# Patient Record
(demographics unavailable — no encounter records)

---
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
## 2025-01-07 NOTE — DATA REVIEWED
[FreeTextEntry1] :  FINDINGS:  There is no evidence of acute infarction, intracranial hemorrhage or mass lesion.  There is hypoattenuation of the subcortical and periventricular white matter, which is nonspecific finding, but most likely represents sequela of chronic microvascular ischemic disease. There are atherosclerotic calcifications of the cavernous and supraclinoid internal carotid arteries bilaterally, and bilateral intradural vertebral arteries. There is prominence of the cortical sulci related to underlying brain parenchymal volume loss.  There is no evidence of hydrocephalus. There are no extra-axial fluid collections.  The patient is status post bilateral lens replacement. The imaged portions of the paranasal sinuses are aerated. The mastoid air cells are clear. The visualized soft tissues and osseous structures appear unremarkable.   IMPRESSION:  No acute intracranial findings.

## 2025-01-07 NOTE — ASSESSMENT
[FreeTextEntry1] : DC MEDS REVIEWED amLODIPine 5 mg oral tablet: 1 tab(s) orally once a day atorvastatin 10 mg oral tablet: 1 tab(s) orally once a day enalapril 20 mg oral tablet: orally 2 times a day Lasix 20 mg oral tablet: 1 tab(s) orally once a day, As Needed metFORMIN 500 mg oral tablet, extended release: 2 tab(s) orally 2 times a day sotalol 120 mg oral tablet: 1 tab(s) orally 2 times a day Xarelto 20 mg oral tablet: 1 tab(s) orally once a day (in the evening)   Dtr concernred about memory / reflexes -= memory getting owrse

## 2025-01-07 NOTE — PHYSICAL EXAM
[Alert] : alert [No Acute Distress] : in no acute distress [Sclera] : the sclera and conjunctiva were normal [EOMI] : extraocular movements were intact [Normal Outer Ear/Nose] : the ears and nose were normal in appearance [Normal Appearance] : the appearance of the neck was normal [Supple] : the neck was supple [No Respiratory Distress] : no respiratory distress [No Acc Muscle Use] : no accessory muscle use [Respiration, Rhythm And Depth] : normal respiratory rhythm and effort [Auscultation Breath Sounds / Voice Sounds] : lungs were clear to auscultation bilaterally [Heart Rate And Rhythm] : heart rate was normal and rhythm regular [Normal Color / Pigmentation] : normal skin color and pigmentation [Normal Turgor] : normal skin turgor [No Focal Deficits] : no focal deficits [Normal Affect] : the affect was normal [Normal Mood] : the mood was normal [Version 3] : Word list version 3 - Village, Kitchen, Baby [Normal Clock Drawn, with correct arm position (2 points)] : normal clock drawn, with correct arm position (2 points) [3 Words (3 points)] : 3 words (3 points) [5 Points] : 5 points

## 2025-01-07 NOTE — HISTORY OF PRESENT ILLNESS
[Post-hospitalization from ___ Hospital] : Post-hospitalization from [unfilled] Hospital [Admitted on: ___] : The patient was admitted on [unfilled] [Discharged on ___] : discharged on [unfilled] [Discharge Summary] : discharge summary [Discharge Med List] : discharge medication list [Med Reconciliation] : medication reconciliation has been completed [FreeTextEntry2] : 84 yo F with PMHx of T2DM not on insulin, HTN, HLD, atrial fibrillation on Xarelto, TIAs who presented to the ED brought in by family with AMS. Per patient's daughter, she started acting off on Christmas Eve, was  confused about the timing of Mass. On Jeff Day the patient was again  confused about the time and why it was light out at 9am however then became more lucid and returned to baseline. Then today the patient called her granddaughter asking for assistance cleaning up. When she arrived at her house  she noticed things strewn about that the patient had removed from storage. The patient was drinking a glass of creamer instead of her coffee. Later today family found the patient walking the rain to the bank with her bag open. 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 Family says the patient gets confused at times, but today was very out of the norm. Of note the patient was recently treated with Augmentin by her PCP for a RLQ abscess. She completed a 7 day course. The patient denies recent fevers, chills, nausea, vomiting,diarrhea or any other complaints. Other members of the family have been sick recently with colds.   In the ED vitals were: rectal temp 100.4, , /71, RR 17, spo2 98% Labs were notable for: 11.03, lactate 2.9, mag 1.3 EKG: afib, HR 92bpm, with ventricular paced complexes   CTAP: Cutaneous plaque like lesion right lower quadrant (301, 80). No  underlying abscess.  Hospital Course Summary: Pt was admitted due to severe sepsis 2/2 unk source and acute metabolic encephalopathy. CXR neg for pulm disease. CT Head also neg. CT a/p findings as below showing cutaneous plaque like lesion in RLQ, no abscess. Labs pertinent for elevated WBC 11, K 2.9, Mg 1.5 and lactate 2.7. RVP neg and UA neg. Pt received IVF, zosyn and vanco IV and repletion of K and Mg with improvement in mental status. Of note, Pt was previously treated with augmentin for Rt pelvic abscess, which has now formed scar tissue on RLQ. Surgery evaluated patient and recommended no incision or drainage as eschar showed no signs of further infection. Pt was also seen by ID, who recommended d/c abx. Pt's mental status has returned to baseline as per family   Since home had fever and aches started on Tamiflu Abd wound helaing

## 2025-01-13 NOTE — HISTORY OF PRESENT ILLNESS
[Post-hospitalization from ___ Hospital] : Post-hospitalization from [unfilled] Hospital [Admitted on: ___] : The patient was admitted on [unfilled] [Discharged on ___] : discharged on [unfilled] [Discharge Summary] : discharge summary [Pertinent Labs] : pertinent labs [Radiology Findings] : radiology findings [Discharge Med List] : discharge medication list [Med Reconciliation] : medication reconciliation has been completed [Patient Contacted By: ____] : and contacted by [unfilled] [FreeTextEntry2] : 85 year old female with past medical history of diabetes type 2 not on insulin,  hypertension, hyperlipidemia, atrial fibrillation on Xarelto s/p pacemaker,  TIAs presenting initially for evaluation for mechanical fall that occurred 1/8.  Patient stated she was walking when she slipped and landed on her back. Patient  denies LOC, dizziness, and or weakness. Patient did not have any pain after the  fall, decided to come in for further evaluation as  she was on xarelto. Of note, patient was discharged 12/31 for sepsis of an unknown source and acute metabolic encephalopathy. Patient has previously been treated for RLQ pelvic abscess as well. Patient was also seen on 1/8 in the ED after left flank pain. UA was negative, pain believed to be musculoskeletal in nature and patient was discharged. The fall then occurred at home. Upon workup in the ED, patient was found to have acute CVA on CTH. NIHSS score is 0. Patient denies any neurological deficits. Denies any numbness, tingling, nausea/vomiting. No reported headache, visual disturbances, and or slurred speech. Patient admitted for further workup.  Patient admitted on 1/9 status post fall rule out CVA.  CTH in ED showed no evidence of bleed, chronic small vessel disease, and highly suspicious right temporal lobe ischemia.  Angiogram done showed possible aneurysm arising from the right internal carotid artery without large vessel occlusion.  Pt without any neuro deficits, NIHSS score in ED 0.   Cannot do MRI due to PPM, repeat CTH with IV contrast done showed small peripheral region of edema right temporal parietal region with mild enhancement, favor small subacute infarct.   switched from xarelto to eliquis, favor outpatient malignancy workup.  Recommend outpatient follow up with neuro interventionalist radiology for possible aneurysm management and further imaging recommendations   Patient transitioned to high intensity statin therapy, converted from xarelto to eliquis for further stroke ppx, no need for ASA as pt is on AC with eliquis.  She is here for hospital follow up  feels well

## 2025-01-22 NOTE — PHYSICAL EXAM
[Normal S1, S2] : normal S1, S2 [No Gallop] : no gallop [Normal] : normal [Rhythm Regular] : regular [Well Developed] : well developed [Well Nourished] : well nourished [No Acute Distress] : no acute distress [Normal Conjunctiva] : normal conjunctiva [Normal Venous Pressure] : normal venous pressure [No Carotid Bruit] : no carotid bruit [Normal Rate] : normal [Normal S1] : normal S1 [Normal S2] : normal S2 [No Murmur] : no murmurs heard [No Pitting Edema] : no pitting edema present [No Abnormalities] : the abdominal aorta was not enlarged and no bruit was heard [Clear Lung Fields] : clear lung fields [Good Air Entry] : good air entry [No Respiratory Distress] : no respiratory distress  [Soft] : abdomen soft [Non Tender] : non-tender [No Masses/organomegaly] : no masses/organomegaly [Normal Bowel Sounds] : normal bowel sounds [Normal Gait] : normal gait [No Edema] : no edema [No Cyanosis] : no cyanosis [No Clubbing] : no clubbing [No Varicosities] : no varicosities [No Rash] : no rash [No Skin Lesions] : no skin lesions [Moves all extremities] : moves all extremities [No Focal Deficits] : no focal deficits [Normal Speech] : normal speech [Alert and Oriented] : alert and oriented [Normal memory] : normal memory

## 2025-01-28 NOTE — HISTORY OF PRESENT ILLNESS
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 Hood in 6/2015 at , TIAs and CVAs with post op course notable for SSS s/p [employer] DC PPM (MDT leads) on 6/16/15 who presents for a cardiac evaluation and hospital follow up.    She had a pulmonary vein isolation by Dr. Hood at  in June of 2015.  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  She underwent ablation in this area.  She subsequently developed a junctional rhythm and was observed in the CCU.  She had recurrence of rapid atrial fibrillation and underwent [employer] dual chamber PPM placement.  She has been maintained on Sotalol since.  She is taking Sotalol 120mg BID for occasional palpitations and AFib management which she is tolerating well.  She has had issues with QT prolongation on higher doses of Sotalol, specifically on the 160mg dose.   Her QT has been stable recently.   She was switched from Xarelto to Eliquis during a hospitalization course for CVA in 1/2025 in the setting of taking Xarelto.    ,  She is up to date with her device check, and it is functioning normally.  She does still experience palpitations that is attributed to PAF mostly at night. Episodes are short lived, denies any aggravating or alleviating factors.  She states she has been compliant with medication regimen for the most part.  She rarely forgets to take her medications.  She admits to taking Lasix 20mg as needed.       Her echocardiogram in September of 2022 showed normal LV function, grade 2 DD, and mild to moderate MR.  She is in AF today, and does not feel it.  ### She is denying any signs of symptoms of abnormal bleeding.  She is denying dizziness, lightheadedness, syncope, PND, orthopnea.

## 2025-01-28 NOTE — DISCUSSION/SUMMARY
[Paroxysmal Atrial Fibrillation] : paroxysmal atrial fibrillation [Hypertension] : hypertension [EKG obtained to assist in diagnosis and management of assessed problem(s)] : EKG obtained to assist in diagnosis and management of assessed problem(s) [FreeTextEntry1] : This is an 84 year old woman with history paroxysmal atrial fibrillation, hypertension, hyperlipidemia, diabetes, and a past TIA who presents to the office for follow up.  She is s/p pulmonary vein isolation at  by Dr. Hood in 2015.  She was found to have SVC triggers, and underwent ablation in this area.  She had post procedure sinus node dysfunction requiring placement of a [employer] dual chamber PPM.  From a cardiovascular perspective, she appears euvolemic.  ECG shows AF today.  She has no symptoms.  Her blood pressure is controlled.  She will continue her current regimen.   She will continue enalapril 20 bid, amlodipine 5 and furosemide 20mg as needed. Last PPM interrogation was reviewed.  I advised we continue sotalol 120 BID.  Her QTC is stable today.   She will continue Xarelto for stroke risk reduction- [id number] ~7.   She will continue her atorvastatin 10mg, with goal LDL is less than 70.   Her last LDL was controlled She will follow in 4 months.  She needs a repeat echocardiogram.

## 2025-01-28 NOTE — HISTORY OF PRESENT ILLNESS
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 Hood in 6/2015 at , TIAs and CVAs with post op course notable for SSS s/p [employer] DC PPM (MDT leads) on 6/16/15 who presents for a cardiac evaluation and hospital follow up.    She had a pulmonary vein isolation by Dr. Hood at  in June of 2015.  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  She underwent ablation in this area.  She subsequently developed a junctional rhythm and was observed in the CCU.  She had recurrence of rapid atrial fibrillation and underwent [employer] dual chamber PPM placement.  She has been maintained on Sotalol since.  She is taking Sotalol 120mg BID for occasional palpitations and AFib management which she is tolerating well.  She has had issues with QT prolongation on higher doses of Sotalol, specifically on the 160mg dose.   Her QT has been stable recently.   She was switched from Xarelto to Eliquis during a hospitalization course for CVA in 1/2025 in the setting of taking Xarelto.    ,  She is up to date with her device check, and it is functioning normally.  She does still experience palpitations that is attributed to PAF mostly at night. Episodes are short lived, denies any aggravating or alleviating factors.  She states she has been compliant with medication regimen for the most part.  She rarely forgets to take her medications.  She admits to taking Lasix 20mg as needed.       Her echocardiogram in September of 2022 showed normal LV function, grade 2 DD, and mild to moderate MR.  She is in AF today, and does not feel it.  ### She is denying any signs of symptoms of abnormal bleeding.  She is denying dizziness, lightheadedness, syncope, PND, orthopnea.

## 2025-01-28 NOTE — HISTORY OF PRESENT ILLNESS
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 Hood in 6/2015 at , TIAs and CVAs with post op course notable for SSS s/p [employer] DC PPM (MDT leads) on 6/16/15 who presents for a cardiac evaluation and hospital follow up.    She had a pulmonary vein isolation by Dr. Hood at  in June of 2015.  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  She underwent ablation in this area.  She subsequently developed a junctional rhythm and was observed in the CCU.  She had recurrence of rapid atrial fibrillation and underwent [employer] dual chamber PPM placement.  She has been maintained on Sotalol since.  She is taking Sotalol 120mg BID for occasional palpitations and AFib management which she is tolerating well.  She has had issues with QT prolongation on higher doses of Sotalol, specifically on the 160mg dose.   Her QT has been stable recently.   She was switched from Xarelto to Eliquis during a hospitalization course for CVA in 1/2025 in the setting of taking Xarelto.    ,  She is up to date with her device check, and it is functioning normally.  She does still experience palpitations that is attributed to PAF mostly at night. Episodes are short lived, denies any aggravating or alleviating factors.  She states she has been compliant with medication regimen for the most part.  She rarely forgets to take her medications.  She admits to taking Lasix 20mg as needed.       Her echocardiogram in September of 2022 showed normal LV function, grade 2 DD, and mild to moderate MR.  She is in AF today, and does not feel it.  ### She is denying any signs of symptoms of abnormal bleeding.  She is denying dizziness, lightheadedness, syncope, PND, orthopnea.

## 2025-01-28 NOTE — DISCUSSION/SUMMARY
[Paroxysmal Atrial Fibrillation] : paroxysmal atrial fibrillation [Hypertension] : hypertension [EKG obtained to assist in diagnosis and management of assessed problem(s)] : EKG obtained to assist in diagnosis and management of assessed problem(s) [FreeTextEntry1] : This is an 84 year old woman with history paroxysmal atrial fibrillation, hypertension, hyperlipidemia, diabetes, and a past TIA who presents to the office for follow up.  She is s/p pulmonary vein isolation at  by Dr. Hood in 2015.  She was found to have SVC triggers, and underwent ablation in this area.  She had post procedure sinus node dysfunction requiring placement of a [employer] dual chamber PPM.  From a cardiovascular perspective, she appears euvolemic.  ECG shows AF today.  She has no symptoms.  Her blood pressure is controlled.  She will continue her current regimen.   She will continue enalapril 20 bid, amlodipine 5 and furosemide 20mg as needed. Last PPM interrogation was reviewed.  I advised we continue sotalol 120 BID.  Her QTC is stable today.   She will continue Xarelto for stroke risk reduction- [id number] ~7.   She will continue her atorvastatin 10mg, with goal LDL is less than 70.   Her last LDL was controlled She will follow in 4 months.  She needs a repeat echocardiogram.

## 2025-01-28 NOTE — DISCUSSION/SUMMARY
[Paroxysmal Atrial Fibrillation] : paroxysmal atrial fibrillation [Hypertension] : hypertension [EKG obtained to assist in diagnosis and management of assessed problem(s)] : EKG obtained to assist in diagnosis and management of assessed problem(s) [FreeTextEntry1] : This is an 84 year old woman with history paroxysmal atrial fibrillation, hypertension, hyperlipidemia, diabetes, and a past TIA who presents to the office for follow up.  She is s/p pulmonary vein isolation at  by Dr. Hood in 2015.  She was found to have SVC triggers, and underwent ablation in this area.  She had post procedure sinus node dysfunction requiring placement of a [employer] dual chamber PPM.  From a cardiovascular perspective, she appears euvolemic.  ECG shows AF today.  She has no symptoms.  Her blood pressure is controlled.  She will continue her current regimen.   She will continue enalapril 20 bid, amlodipine 5 and furosemide 20mg as needed. Last PPM interrogation was reviewed.  I advised we continue sotalol 120 BID.  Her QTC is stable today.   She will continue Xarelto for stroke risk reduction- [id number] ~7.   She will continue her atorvastatin 10mg, with goal LDL is less than 70.   Her last LDL was controlled She will follow in 4 months.  She needs a repeat echocardiogram.

## 2025-01-28 NOTE — CARDIOLOGY SUMMARY
[de-identified] : Rate controlled AF.  Occasional pacing.  [de-identified] : 2/ 2021 - EF 60-65%\par  Mild MR, mild-mod aortic regurg\par  LA volume 40\par  NML LV systolic function-no segmental wall motion abnormalities\par  mild diastolic\par  severe pulm HTN\par  NML TV [de-identified] : BST PPM- BATTERY 6.5\par  AF burden 16%

## 2025-01-28 NOTE — HISTORY OF PRESENT ILLNESS
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 Hood in 6/2015 at , TIAs and CVAs with post op course notable for SSS s/p [place] [employer] DC PPM (MDT leads) on 6/16/15 who presents for a cardiac evaluation and hospital follow up.    She had a pulmonary vein isolation by Dr. Hood at  in June of 2015.  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  She underwent ablation in this area.  She subsequently developed a junctional rhythm and was observed in the CCU.  She had recurrence of rapid atrial fibrillation and underwent [place] [employer] dual chamber PPM placement.  She has been maintained on Sotalol since.  She is taking Sotalol 120mg BID for occasional palpitations and AFib management which she is tolerating well.  She has had issues with QT prolongation on higher doses of Sotalol, specifically on the 160mg dose.   Her QT has been stable recently.   She was switched from Xarelto to Eliquis during a hospitalization course for CVA in 1/2025 in the setting of taking Xarelto.    ,  She is up to date with her device check, and it is functioning normally.  She does still experience palpitations that is attributed to PAF mostly at night. Episodes are short lived, denies any aggravating or alleviating factors.  She states she has been compliant with medication regimen for the most part.  She rarely forgets to take her medications.  She admits to taking Lasix 20mg as needed.       Her echocardiogram in September of 2022 showed normal LV function, grade 2 DD, and mild to moderate MR.  She is in AF today, and does not feel it.  ### She is denying any signs of symptoms of abnormal bleeding.  She is denying dizziness, lightheadedness, syncope, PND, orthopnea.

## 2025-01-28 NOTE — DISCUSSION/SUMMARY
[Paroxysmal Atrial Fibrillation] : paroxysmal atrial fibrillation [Hypertension] : hypertension [EKG obtained to assist in diagnosis and management of assessed problem(s)] : EKG obtained to assist in diagnosis and management of assessed problem(s) [FreeTextEntry1] : This is an 84 year old woman with history paroxysmal atrial fibrillation, hypertension, hyperlipidemia, diabetes, and a past TIA who presents to the office for follow up.  She is s/p pulmonary vein isolation at  by Dr. Hood in 2015.  She was found to have SVC triggers, and underwent ablation in this area.  She had post procedure sinus node dysfunction requiring placement of a [place] [employer] dual chamber PPM.  From a cardiovascular perspective, she appears euvolemic.  ECG shows AF today.  She has no symptoms.  Her blood pressure is controlled.  She will continue her current regimen.   She will continue enalapril 20 bid, amlodipine 5 and furosemide 20mg as needed. Last PPM interrogation was reviewed.  I advised we continue sotalol 120 BID.  Her QTC is stable today.   She will continue Xarelto for stroke risk reduction- [id number] ~7.   She will continue her atorvastatin 10mg, with goal LDL is less than 70.   Her last LDL was controlled She will follow in 4 months.  She needs a repeat echocardiogram.

## 2025-01-28 NOTE — HISTORY OF PRESENT ILLNESS
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 Hood in 6/2015 at , TIAs and CVAs with post op course notable for SSS s/p [place] [employer] DC PPM (MDT leads) on 6/16/15 who presents for a cardiac evaluation and hospital follow up.    She had a pulmonary vein isolation by Dr. Hood at  in June of 2015.  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  She underwent ablation in this area.  She subsequently developed a junctional rhythm and was observed in the CCU.  She had recurrence of rapid atrial fibrillation and underwent [place] [employer] dual chamber PPM placement.  She has been maintained on Sotalol since.  She is taking Sotalol 120mg BID for occasional palpitations and AFib management which she is tolerating well.  She has had issues with QT prolongation on higher doses of Sotalol, specifically on the 160mg dose.   Her QT has been stable recently.   She was switched from Xarelto to Eliquis during a hospitalization course for CVA in 1/2025 in the setting of taking Xarelto.    ,  She is up to date with her device check, and it is functioning normally.  She does still experience palpitations that is attributed to PAF mostly at night. Episodes are short lived, denies any aggravating or alleviating factors.  She states she has been compliant with medication regimen for the most part.  She rarely forgets to take her medications.  She admits to taking Lasix 20mg as needed.       Her echocardiogram in September of 2022 showed normal LV function, grade 2 DD, and mild to moderate MR.  She is in AF today, and does not feel it.  ### She is denying any signs of symptoms of abnormal bleeding.  She is denying dizziness, lightheadedness, syncope, PND, orthopnea.

## 2025-01-28 NOTE — HISTORY OF PRESENT ILLNESS
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 Hood in 6/2015 at , TIAs and CVAs with post op course notable for SSS s/p [place] [employer] DC PPM (MDT leads) on 6/16/15 who presents for a cardiac evaluation and hospital follow up.    She had a pulmonary vein isolation by Dr. Hood at  in June of 2015.  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  She underwent ablation in this area.  She subsequently developed a junctional rhythm and was observed in the CCU.  She had recurrence of rapid atrial fibrillation and underwent [place] [employer] dual chamber PPM placement.  She has been maintained on Sotalol since.  She is taking Sotalol 120mg BID for occasional palpitations and AFib management which she is tolerating well.  She has had issues with QT prolongation on higher doses of Sotalol, specifically on the 160mg dose.   Her QT has been stable recently.   She was switched from Xarelto to Eliquis during a hospitalization course for CVA in 1/2025 in the setting of taking Xarelto.    ,  She is up to date with her device check, and it is functioning normally.  She does still experience palpitations that is attributed to PAF mostly at night. Episodes are short lived, denies any aggravating or alleviating factors.  She states she has been compliant with medication regimen for the most part.  She rarely forgets to take her medications.  She admits to taking Lasix 20mg as needed.       Her echocardiogram in September of 2022 showed normal LV function, grade 2 DD, and mild to moderate MR.  She is in AF today, and does not feel it.  ### She is denying any signs of symptoms of abnormal bleeding.  She is denying dizziness, lightheadedness, syncope, PND, orthopnea.

## 2025-01-28 NOTE — CARDIOLOGY SUMMARY
[de-identified] : Rate controlled AF.  Occasional pacing.  [de-identified] : 2/ 2021 - EF 60-65%\par  Mild MR, mild-mod aortic regurg\par  LA volume 40\par  NML LV systolic function-no segmental wall motion abnormalities\par  mild diastolic\par  severe pulm HTN\par  NML TV [de-identified] : BST PPM- BATTERY 6.5\par  AF burden 16%

## 2025-01-28 NOTE — DISCUSSION/SUMMARY
[Paroxysmal Atrial Fibrillation] : paroxysmal atrial fibrillation [Hypertension] : hypertension [EKG obtained to assist in diagnosis and management of assessed problem(s)] : EKG obtained to assist in diagnosis and management of assessed problem(s) [FreeTextEntry1] : This is an 84 year old woman with history paroxysmal atrial fibrillation, hypertension, hyperlipidemia, diabetes, and a past TIA who presents to the office for follow up.  She is s/p pulmonary vein isolation at  by Dr. Hood in 2015.  She was found to have SVC triggers, and underwent ablation in this area.  She had post procedure sinus node dysfunction requiring placement of a [place] [employer] dual chamber PPM.  From a cardiovascular perspective, she appears euvolemic.  ECG shows AF today.  She has no symptoms.  Her blood pressure is controlled.  She will continue her current regimen.   She will continue enalapril 20 bid, amlodipine 5 and furosemide 20mg as needed. Last PPM interrogation was reviewed.  I advised we continue sotalol 120 BID.  Her QTC is stable today.   She will continue Xarelto for stroke risk reduction- [id number] ~7.   She will continue her atorvastatin 10mg, with goal LDL is less than 70.   Her last LDL was controlled She will follow in 4 months.  She needs a repeat echocardiogram.

## 2025-01-28 NOTE — DISCUSSION/SUMMARY
[Paroxysmal Atrial Fibrillation] : paroxysmal atrial fibrillation [Hypertension] : hypertension [EKG obtained to assist in diagnosis and management of assessed problem(s)] : EKG obtained to assist in diagnosis and management of assessed problem(s) [FreeTextEntry1] : This is an 84 year old woman with history paroxysmal atrial fibrillation, hypertension, hyperlipidemia, diabetes, and a past TIA who presents to the office for follow up.  She is s/p pulmonary vein isolation at  by Dr. Hood in 2015.  She was found to have SVC triggers, and underwent ablation in this area.  She had post procedure sinus node dysfunction requiring placement of a [place] [employer] dual chamber PPM.  From a cardiovascular perspective, she appears euvolemic.  ECG shows AF today.  She has no symptoms.  Her blood pressure is controlled.  She will continue her current regimen.   She will continue enalapril 20 bid, amlodipine 5 and furosemide 20mg as needed. Last PPM interrogation was reviewed.  I advised we continue sotalol 120 BID.  Her QTC is stable today.   She will continue Xarelto for stroke risk reduction- [id number] ~7.   She will continue her atorvastatin 10mg, with goal LDL is less than 70.   Her last LDL was controlled She will follow in 4 months.  She needs a repeat echocardiogram.

## 2025-01-28 NOTE — HISTORY OF PRESENT ILLNESS
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 Hood in 6/2015 at , TIAs and CVAs with post op course notable for SSS s/p [place] [employer] DC PPM (MDT leads) on 6/16/15 who presents for a cardiac evaluation and hospital follow up.    She had a pulmonary vein isolation by Dr. Hood at  in June of 2015.  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  She underwent ablation in this area.  She subsequently developed a junctional rhythm and was observed in the CCU.  She had recurrence of rapid atrial fibrillation and underwent [place] [employer] dual chamber PPM placement.  She has been maintained on Sotalol since.  She is taking Sotalol 120mg BID for occasional palpitations and AFib management which she is tolerating well.  She has had issues with QT prolongation on higher doses of Sotalol, specifically on the 160mg dose.   Her QT has been stable recently.   She was switched from Xarelto to Eliquis during a hospitalization course for CVA in 1/2025 in the setting of taking Xarelto.    ,  She is up to date with her device check, and it is functioning normally.  She does still experience palpitations that is attributed to PAF mostly at night. Episodes are short lived, denies any aggravating or alleviating factors.  She states she has been compliant with medication regimen for the most part.  She rarely forgets to take her medications.  She admits to taking Lasix 20mg as needed.       Her echocardiogram in September of 2022 showed normal LV function, grade 2 DD, and mild to moderate MR.  She is in AF today, and does not feel it.  ### She is denying any signs of symptoms of abnormal bleeding.  She is denying dizziness, lightheadedness, syncope, PND, orthopnea.

## 2025-01-28 NOTE — CARDIOLOGY SUMMARY
[de-identified] : Rate controlled AF.  Occasional pacing.  [de-identified] : 2/ 2021 - EF 60-65%\par  Mild MR, mild-mod aortic regurg\par  LA volume 40\par  NML LV systolic function-no segmental wall motion abnormalities\par  mild diastolic\par  severe pulm HTN\par  NML TV [de-identified] : BST PPM- BATTERY 6.5\par  AF burden 16%

## 2025-01-28 NOTE — CARDIOLOGY SUMMARY
[de-identified] : Rate controlled AF.  Occasional pacing.  [de-identified] : 2/ 2021 - EF 60-65%\par  Mild MR, mild-mod aortic regurg\par  LA volume 40\par  NML LV systolic function-no segmental wall motion abnormalities\par  mild diastolic\par  severe pulm HTN\par  NML TV [de-identified] : BST PPM- BATTERY 6.5\par  AF burden 16%

## 2025-01-28 NOTE — REVIEW OF SYSTEMS
[Dyspnea on exertion] : dyspnea during exertion [Palpitations] : palpitations [Negative] : Heme/Lymph [FreeTextEntry5] : as per HPI

## 2025-01-28 NOTE — DISCUSSION/SUMMARY
[Paroxysmal Atrial Fibrillation] : paroxysmal atrial fibrillation [Hypertension] : hypertension [EKG obtained to assist in diagnosis and management of assessed problem(s)] : EKG obtained to assist in diagnosis and management of assessed problem(s) [FreeTextEntry1] : This is an 84 year old woman with history paroxysmal atrial fibrillation, hypertension, hyperlipidemia, diabetes, and a past TIA who presents to the office for follow up.  She is s/p pulmonary vein isolation at  by Dr. Hood in 2015.  She was found to have SVC triggers, and underwent ablation in this area.  She had post procedure sinus node dysfunction requiring placement of a [place] [employer] dual chamber PPM.  From a cardiovascular perspective, she appears euvolemic.  ECG shows AF today.  She has no symptoms.  Her blood pressure is controlled.  She will continue her current regimen.   She will continue enalapril 20 bid, amlodipine 5 and furosemide 20mg as needed. Last PPM interrogation was reviewed.  I advised we continue sotalol 120 BID.  Her QTC is stable today.   She will continue Xarelto for stroke risk reduction- [id number] ~7.   She will continue her atorvastatin 10mg, with goal LDL is less than 70.   Her last LDL was controlled She will follow in 4 months.  She needs a repeat echocardiogram.

## 2025-01-28 NOTE — DISCUSSION/SUMMARY
[Paroxysmal Atrial Fibrillation] : paroxysmal atrial fibrillation [Hypertension] : hypertension [EKG obtained to assist in diagnosis and management of assessed problem(s)] : EKG obtained to assist in diagnosis and management of assessed problem(s) [FreeTextEntry1] : This is an 84 year old woman with history paroxysmal atrial fibrillation, hypertension, hyperlipidemia, diabetes, and a past TIA who presents to the office for follow up.  She is s/p pulmonary vein isolation at  by Dr. Hood in 2015.  She was found to have SVC triggers, and underwent ablation in this area.  She had post procedure sinus node dysfunction requiring placement of a [place] [employer] dual chamber PPM.  From a cardiovascular perspective, she appears euvolemic.  ECG shows AF today.  She has no symptoms.  Her blood pressure is controlled.  She will continue her current regimen.   She will continue enalapril 20 bid, amlodipine 5 and furosemide 20mg as needed. Last PPM interrogation was reviewed.  I advised we continue sotalol 120 BID.  Her QTC is stable today.   She will continue Xarelto for stroke risk reduction- [id number] ~7.   She will continue her atorvastatin 10mg, with goal LDL is less than 70.   Her last LDL was controlled She will follow in 4 months.  She needs a repeat echocardiogram.

## 2025-01-28 NOTE — CARDIOLOGY SUMMARY
[de-identified] : Rate controlled AF.  Occasional pacing.  [de-identified] : 2/ 2021 - EF 60-65%\par  Mild MR, mild-mod aortic regurg\par  LA volume 40\par  NML LV systolic function-no segmental wall motion abnormalities\par  mild diastolic\par  severe pulm HTN\par  NML TV [de-identified] : BST PPM- BATTERY 6.5\par  AF burden 16%

## 2025-01-28 NOTE — HISTORY OF PRESENT ILLNESS
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 Hood in 6/2015 at , TIAs and CVAs with post op course notable for SSS s/p [place] [employer] DC PPM (MDT leads) on 6/16/15 who presents for a cardiac evaluation and hospital follow up.    She had a pulmonary vein isolation by Dr. Hood at  in June of 2015.  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  She underwent ablation in this area.  She subsequently developed a junctional rhythm and was observed in the CCU.  She had recurrence of rapid atrial fibrillation and underwent [place] [employer] dual chamber PPM placement.  She has been maintained on Sotalol since.  She is taking Sotalol 120mg BID for occasional palpitations and AFib management which she is tolerating well.  She has had issues with QT prolongation on higher doses of Sotalol, specifically on the 160mg dose.   Her QT has been stable recently.   She was switched from Xarelto to Eliquis during a hospitalization course for CVA in 1/2025 in the setting of taking Xarelto.    ,  She is up to date with her device check, and it is functioning normally.  She does still experience palpitations that is attributed to PAF mostly at night. Episodes are short lived, denies any aggravating or alleviating factors.  She states she has been compliant with medication regimen for the most part.  She rarely forgets to take her medications.  She admits to taking Lasix 20mg as needed.       Her echocardiogram in September of 2022 showed normal LV function, grade 2 DD, and mild to moderate MR.  She is in AF today, and does not feel it.  ### She is denying any signs of symptoms of abnormal bleeding.  She is denying dizziness, lightheadedness, syncope, PND, orthopnea.

## 2025-01-28 NOTE — CARDIOLOGY SUMMARY
[de-identified] : Rate controlled AF.  Occasional pacing.  [de-identified] : 2/ 2021 - EF 60-65%\par  Mild MR, mild-mod aortic regurg\par  LA volume 40\par  NML LV systolic function-no segmental wall motion abnormalities\par  mild diastolic\par  severe pulm HTN\par  NML TV [de-identified] : BST PPM- BATTERY 6.5\par  AF burden 16%

## 2025-01-28 NOTE — CARDIOLOGY SUMMARY
[de-identified] : Rate controlled AF.  Occasional pacing.  [de-identified] : 2/ 2021 - EF 60-65%\par  Mild MR, mild-mod aortic regurg\par  LA volume 40\par  NML LV systolic function-no segmental wall motion abnormalities\par  mild diastolic\par  severe pulm HTN\par  NML TV [de-identified] : BST PPM- BATTERY 6.5\par  AF burden 16%

## 2025-01-28 NOTE — CARDIOLOGY SUMMARY
[de-identified] : Rate controlled AF.  Occasional pacing.  [de-identified] : 2/ 2021 - EF 60-65%\par  Mild MR, mild-mod aortic regurg\par  LA volume 40\par  NML LV systolic function-no segmental wall motion abnormalities\par  mild diastolic\par  severe pulm HTN\par  NML TV [de-identified] : BST PPM- BATTERY 6.5\par  AF burden 16%

## 2025-01-28 NOTE — DISCUSSION/SUMMARY
[Paroxysmal Atrial Fibrillation] : paroxysmal atrial fibrillation [Hypertension] : hypertension [EKG obtained to assist in diagnosis and management of assessed problem(s)] : EKG obtained to assist in diagnosis and management of assessed problem(s) [FreeTextEntry1] : This is an 84 year old woman with history paroxysmal atrial fibrillation, hypertension, hyperlipidemia, diabetes, and a past TIA who presents to the office for follow up.  She is s/p pulmonary vein isolation at  by Dr. Hood in 2015.  She was found to have SVC triggers, and underwent ablation in this area.  She had post procedure sinus node dysfunction requiring placement of a [place] [employer] dual chamber PPM.  From a cardiovascular perspective, she appears euvolemic.  ECG shows AF today.  She has no symptoms.  Her blood pressure is controlled.  She will continue her current regimen.   She will continue enalapril 20 bid, amlodipine 5 and furosemide 20mg as needed. Last PPM interrogation was reviewed.  I advised we continue sotalol 120 BID.  Her QTC is stable today.   She will continue Xarelto for stroke risk reduction- [id number] ~7.   She will continue her atorvastatin 10mg, with goal LDL is less than 70.   Her last LDL was controlled She will follow in 4 months.  She needs a repeat echocardiogram.

## 2025-01-28 NOTE — CARDIOLOGY SUMMARY
[de-identified] : Rate controlled AF.  Occasional pacing.  [de-identified] : 2/ 2021 - EF 60-65%\par  Mild MR, mild-mod aortic regurg\par  LA volume 40\par  NML LV systolic function-no segmental wall motion abnormalities\par  mild diastolic\par  severe pulm HTN\par  NML TV [de-identified] : BST PPM- BATTERY 6.5\par  AF burden 16%

## 2025-01-28 NOTE — HISTORY OF PRESENT ILLNESS
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 Hood in 6/2015 at , TIAs and CVAs with post op course notable for SSS s/p [place] [employer] DC PPM (MDT leads) on 6/16/15 who presents for a cardiac evaluation and hospital follow up.    She had a pulmonary vein isolation by Dr. Hood at  in June of 2015.  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  She underwent ablation in this area.  She subsequently developed a junctional rhythm and was observed in the CCU.  She had recurrence of rapid atrial fibrillation and underwent [place] [employer] dual chamber PPM placement.  She has been maintained on Sotalol since.  She is taking Sotalol 120mg BID for occasional palpitations and AFib management which she is tolerating well.  She has had issues with QT prolongation on higher doses of Sotalol, specifically on the 160mg dose.   Her QT has been stable recently.   She was switched from Xarelto to Eliquis during a hospitalization course for CVA in 1/2025 in the setting of taking Xarelto.    ,  She is up to date with her device check, and it is functioning normally.  She does still experience palpitations that is attributed to PAF mostly at night. Episodes are short lived, denies any aggravating or alleviating factors.  She states she has been compliant with medication regimen for the most part.  She rarely forgets to take her medications.  She admits to taking Lasix 20mg as needed.       Her echocardiogram in September of 2022 showed normal LV function, grade 2 DD, and mild to moderate MR.  She is in AF today, and does not feel it.  ### She is denying any signs of symptoms of abnormal bleeding.  She is denying dizziness, lightheadedness, syncope, PND, orthopnea.

## 2025-05-14 NOTE — PHYSICAL EXAM
[Well Developed] : well developed [Well Nourished] : well nourished [No Acute Distress] : no acute distress [Normal Conjunctiva] : normal conjunctiva [Normal Venous Pressure] : normal venous pressure [No Carotid Bruit] : no carotid bruit [Normal Rate] : normal [Irregularly Irregular] : irregularly irregular [Normal S1] : normal S1 [Normal S2] : normal S2 [No Murmur] : no murmurs heard [No Pitting Edema] : no pitting edema present [No Abnormalities] : the abdominal aorta was not enlarged and no bruit was heard [Clear Lung Fields] : clear lung fields [Good Air Entry] : good air entry [No Respiratory Distress] : no respiratory distress  [Soft] : abdomen soft [Non Tender] : non-tender [No Masses/organomegaly] : no masses/organomegaly [Normal Bowel Sounds] : normal bowel sounds [Normal Gait] : normal gait [No Edema] : no edema [No Cyanosis] : no cyanosis [No Clubbing] : no clubbing [No Varicosities] : no varicosities [No Rash] : no rash [No Skin Lesions] : no skin lesions [Moves all extremities] : moves all extremities [No Focal Deficits] : no focal deficits [Normal Speech] : normal speech [Alert and Oriented] : alert and oriented [Normal memory] : normal memory

## 2025-05-14 NOTE — HISTORY OF PRESENT ILLNESS
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 Hood in 6/2015 with post op course notable for SSS s/p [place] [employer] DC PPM implant (MDT RA/RV leads) on 6/19/15, TIAs and CVA (1/2025) with residual body weakness who presents for a cardiac evaluation and hospital follow up.    She had pulmonary vein isolation by Dr. Hood at  in June of 2015.  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  She underwent ablation in this area.  She subsequently developed a junctional rhythm and was observed in the CCU.  She had recurrence of rapid atrial fibrillation and underwent [place] [employer] dual chamber PPM placement.  She has been maintained on Sotalol since.   She has had issues with QT prolongation on higher doses of sotalol, specifically on the 160mg dose.   Her QT has been stable recently.   She was switched from Xarelto to Eliquis while hospitalized for CVA in 1/2025.  She is denying any signs of symptoms of abnormal bleeding.  She is denying dizziness, lightheadedness, syncope, PND, orthopnea.       She presents today with her daughter Claudette as a hospital follow up.  She was recently hospitalized at [facility] on 12/30/2024 to 1/1/25 for sepsis with fever of unknown source (negative blood cultures) but notable RLQ abscess.   She presented to [facility] on 1/8/25 for B/L LE weakness and mechanical fall on her side (not her head) and diagnosed with a small acute infarct in the temporal parietal region with HCT scan.  Unable to perform an MRI scan with her PPM.  Inpatient TTE on 1/10/25 revealed normal LV/RV systolic function with EF 64%, normal LA, mild LVH, mild MR and mild AI.  Estimated PASP 35mmHg.     She is up to date with her device check, and it is functioning normally.  Medication reconciliation was performed.

## 2025-05-14 NOTE — CARDIOLOGY SUMMARY
[de-identified] : Atrial fibrillation with pacing on demand [de-identified] : 1/10/25: normal LV/RV systolic function with EF 64%, normal LA, mild LVH, mild MR and mild AI.  Estimated PASP 35mmHg.  2/2021 - EF 60-65% Mild MR, mild-mod aortic regurg LA volume 40 NML LV systolic function-no segmental wall motion abnormalities mild diastolic severe pulm HTN NML TV [de-identified] : BST PPM- BATTERY 6.5\par  AF burden 16%

## 2025-05-14 NOTE — REVIEW OF SYSTEMS
[Feeling Fatigued] : feeling fatigued [Weight Loss (___ Lbs)] : [unfilled] ~Ulb weight loss [Weakness] : weakness [Negative] : Heme/Lymph

## 2025-05-14 NOTE — DISCUSSION/SUMMARY
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 Hood in 6/2015 with post op course notable for SSS s/p [employer] DC PPM implant (MDT RA/RV leads) on 6/19/15, TIAs and CVA (1/2025) with residual body weakness who presents for a cardiac evaluation and hospital follow up.    Dr. 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  She is clinically euvolemic on exam.  ECG illustrates rate controlled AF without obvious ischemia or chamber enlargement.  Blood pressure is controlled.    She will continue Enalapril 20mg BID and Amlodipine 5mg daily for blood pressure control. She will continue Sotalol 120mg BID for rate control AFib.  She may be in chronic AF, and I will discuss changing to Toprol with EP. She will continue Atorvastatin 40mg daily for LDL goal < 70 and recent h/o CVA. She will continue Eliquis 2.5mg BID for prevention of secondary CVA and or thromboembolism She will follow up with Dr. Evans Christy, Vascular Neurologist.    Exercise and diet counseling was performed in order to reduce future cardiovascular risk.  She will follow up in 2 months or sooner as needed.     [EKG obtained to assist in diagnosis and management of assessed problem(s)] : EKG obtained to assist in diagnosis and management of assessed problem(s)
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
## 2025-05-27 NOTE — PHYSICAL EXAM
[Alert] : alert [Sclera] : the sclera and conjunctiva were normal [EOMI] : extraocular movements were intact [Normal Outer Ear/Nose] : the ears and nose were normal in appearance [Normal Appearance] : the appearance of the neck was normal [Supple] : the neck was supple [No Respiratory Distress] : no respiratory distress [No Acc Muscle Use] : no accessory muscle use [Respiration, Rhythm And Depth] : normal respiratory rhythm and effort [Auscultation Breath Sounds / Voice Sounds] : lungs were clear to auscultation bilaterally [Normal S1, S2] : normal S1 and S2 [Heart Rate And Rhythm] : heart rate was normal and rhythm regular [Bowel Sounds] : normal bowel sounds [Abdomen Tenderness] : non-tender [Abdomen Soft] : soft [Cervical Lymph Nodes Enlarged Posterior Bilaterally] : posterior cervical [Supraclavicular Lymph Nodes Enlarged Bilaterally] : supraclavicular [Cervical Lymph Nodes Enlarged Anterior Bilaterally] : anterior cervical, supraclavicular [Axillary Lymph Nodes Enlarged Bilaterally] : axillary [No Spinal Tenderness] : no spinal tenderness [No Clubbing, Cyanosis] : no clubbing or cyanosis of the fingernails [Involuntary Movements] : no involuntary movements were seen [Motor Tone] : muscle strength and tone were normal [Normal Color / Pigmentation] : normal skin color and pigmentation [Normal Turgor] : normal skin turgor [Sensation] : the sensory exam was normal to light touch and pinprick [No Focal Deficits] : no focal deficits [Motor Exam] : the motor exam was normal [Normal Affect] : the affect was normal [Normal Mood] : the mood was normal [de-identified] : Decreased gait speed.

## 2025-05-27 NOTE — REASON FOR VISIT
[Follow-Up] : a follow-up visit [Family Member] : family member [FreeTextEntry1] : Depression, Gait Instability, Unintentional Weight Loss

## 2025-06-12 NOTE — DISCUSSION/SUMMARY
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 Hood in (6/2015) with post- op course complicated by Sinoatrial node dysfunction s/p [employer] DC PPM implant (MDT RA/RV leads) on (6/19/15), TIAs and CVA (1/2025) with residual body weakness who presents for a device interrogation in an effort to determine whether or not patient requires Pacemaker generator replacement.  Patient was recently hospitalized at [facility] on 12/30/2024 to 1/1/25 for sepsis with fever of unknown source (negative blood cultures) but notable RLQ abscess. She presented to [facility] on 1/8/25 for B/L LE weakness and mechanical fall on her side (not her head) and diagnosed with a small acute infarct in the temporal parietal region with HCT scan. Unable to perform an MRI scan with her PPM. Inpatient TTE on 1/10/25 revealed normal LV/RV systolic function with EF 64%, normal LA, mild LVH, mild MR and mild AI. Estimated PASP 35mmHg. She had pulmonary vein isolation by Dr. Hood at  in June of 2015. 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 She underwent ablation in this area. She subsequently developed a junctional rhythm and was observed in the CCU. She had recurrence of rapid atrial fibrillation and underwent [employer] dual chamber PPM placement. She has been maintained on Sotalol since.  Impression:  Sinoatrial node dysfunction s/p PPM Appropriate sensing and capture thresholds, stable lead impedances, and battery status.  Recent AT/AF episodes c/w atrial fibrillation with RVR and HR's 65bpm to 288 bpm consistent with previous history.  Battery status is stable with 2 yrs longevity but could be unpredictable due to [employer] adversary of premature battery depletion.   Plan:  Patient will be scheduled for pacemaker generator replacement ASAP Continue medications as previously prescribed Will receive instructions prior to generator replacement Follow up with device clinic post-procedure for wound ceck and device interrogation.

## 2025-06-12 NOTE — DISCUSSION/SUMMARY
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 Hood in (6/2015) with post- op course complicated by Sinoatrial node dysfunction s/p [employer] DC PPM implant (MDT RA/RV leads) on (6/19/15), TIAs and CVA (1/2025) with residual body weakness who presents for a device interrogation in an effort to determine whether or not patient requires Pacemaker generator replacement.  Patient was recently hospitalized at [facility] on 12/30/2024 to 1/1/25 for sepsis with fever of unknown source (negative blood cultures) but notable RLQ abscess. She presented to [facility] on 1/8/25 for B/L LE weakness and mechanical fall on her side (not her head) and diagnosed with a small acute infarct in the temporal parietal region with HCT scan. Unable to perform an MRI scan with her PPM. Inpatient TTE on 1/10/25 revealed normal LV/RV systolic function with EF 64%, normal LA, mild LVH, mild MR and mild AI. Estimated PASP 35mmHg. She had pulmonary vein isolation by Dr. Hood at  in June of 2015. 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 She underwent ablation in this area. She subsequently developed a junctional rhythm and was observed in the CCU. She had recurrence of rapid atrial fibrillation and underwent [employer] dual chamber PPM placement. She has been maintained on Sotalol since.  Impression:  Sinoatrial node dysfunction s/p PPM Appropriate sensing and capture thresholds, stable lead impedances, and battery status.  Recent AT/AF episodes c/w atrial fibrillation with RVR and HR's 65bpm to 288 bpm consistent with previous history.  Battery status is stable with 2 yrs longevity but could be unpredictable due to [employer] adversary of premature battery depletion.   Plan:  Patient will be scheduled for pacemaker generator replacement ASAP Continue medications as previously prescribed Will receive instructions prior to generator replacement Follow up with device clinic post-procedure for wound ceck and device interrogation.

## 2025-06-12 NOTE — HISTORY OF PRESENT ILLNESS
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 Hood in (6/2015) with post- op course complicated by Sinoatrial node dysfunction s/p [employer] DC PPM implant (MDT RA/RV leads) on (6/19/15), TIAs and CVA (1/2025) with residual body weakness who presents for a device interrogation in an effort to determine whether or not patient requires Pacemaker generator replacement.  Patient was recently hospitalized at [facility] on 12/30/2024 to 1/1/25 for sepsis with fever of unknown source (negative blood cultures) but notable RLQ abscess. She presented to [facility] on 1/8/25 for B/L LE weakness and mechanical fall on her side (not her head) and diagnosed with a small acute infarct in the temporal parietal region with HCT scan. Unable to perform an MRI scan with her PPM. Inpatient TTE on 1/10/25 revealed normal LV/RV systolic function with EF 64%, normal LA, mild LVH, mild MR and mild AI. Estimated PASP 35mmHg. She had pulmonary vein isolation by Dr. Hood at  in June of 2015. 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 She underwent ablation in this area. She subsequently developed a junctional rhythm and was observed in the CCU. She had recurrence of rapid atrial fibrillation and underwent [employer] dual chamber PPM placement. She has been maintained on Sotalol since.

## 2025-06-12 NOTE — PROCEDURE
[Sinoatrial Exit Block] : sinoatrial exit block [Pacemaker] : pacemaker [DDDR] : DDDR [Magnet Rate: ___ Ppm] : magnet rate was [unfilled] Ppm [Threshold Testing Performed] : Threshold testing was performed [Lead Imp:  ___ohms] : lead impedance was [unfilled] ohms [Sensing Amplitude ___mv] : sensing amplitude was [unfilled] mv [___V @] : [unfilled] V [___ ms] : [unfilled] ms [None] : none [Pace ___ %] : Pace [unfilled]% [de-identified] : [facility] [de-identified] : Prem  [de-identified] : [id number] [de-identified] : 06/19/2025 [de-identified] : 60/120 [de-identified] : 2 yrs remaining longevity [de-identified] : Appropriate sensing and capture thresholds, stable lead impedances, and battery status. recent AT/AF episodes c/w atrial fibrillation with RVR and HR's 65bpm to 288 bpm consistent with previous history. Battery status is stable with 2 yrs longevity but could be unpredictable due to [employer] adversary of premature battery depletion. Patient will be scheduled for pacemaker generator replacement.

## 2025-06-12 NOTE — PROCEDURE
[Sinoatrial Exit Block] : sinoatrial exit block [Pacemaker] : pacemaker [DDDR] : DDDR [Magnet Rate: ___ Ppm] : magnet rate was [unfilled] Ppm [Threshold Testing Performed] : Threshold testing was performed [Lead Imp:  ___ohms] : lead impedance was [unfilled] ohms [Sensing Amplitude ___mv] : sensing amplitude was [unfilled] mv [___V @] : [unfilled] V [___ ms] : [unfilled] ms [None] : none [Pace ___ %] : Pace [unfilled]% [de-identified] : [facility] [de-identified] : Prem  [de-identified] : [id number] [de-identified] : 06/19/2025 [de-identified] : 60/120 [de-identified] : 2 yrs remaining longevity [de-identified] : Appropriate sensing and capture thresholds, stable lead impedances, and battery status. recent AT/AF episodes c/w atrial fibrillation with RVR and HR's 65bpm to 288 bpm consistent with previous history. Battery status is stable with 2 yrs longevity but could be unpredictable due to [employer] adversary of premature battery depletion. Patient will be scheduled for pacemaker generator replacement.

## 2025-06-12 NOTE — HISTORY OF PRESENT ILLNESS
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 Hood in (6/2015) with post- op course complicated by Sinoatrial node dysfunction s/p [employer] DC PPM implant (MDT RA/RV leads) on (6/19/15), TIAs and CVA (1/2025) with residual body weakness who presents for a device interrogation in an effort to determine whether or not patient requires Pacemaker generator replacement.  Patient was recently hospitalized at [facility] on 12/30/2024 to 1/1/25 for sepsis with fever of unknown source (negative blood cultures) but notable RLQ abscess. She presented to [facility] on 1/8/25 for B/L LE weakness and mechanical fall on her side (not her head) and diagnosed with a small acute infarct in the temporal parietal region with HCT scan. Unable to perform an MRI scan with her PPM. Inpatient TTE on 1/10/25 revealed normal LV/RV systolic function with EF 64%, normal LA, mild LVH, mild MR and mild AI. Estimated PASP 35mmHg. She had pulmonary vein isolation by Dr. Hood at  in June of 2015. 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 She underwent ablation in this area. She subsequently developed a junctional rhythm and was observed in the CCU. She had recurrence of rapid atrial fibrillation and underwent [employer] dual chamber PPM placement. She has been maintained on Sotalol since.

## 2025-06-12 NOTE — CARDIOLOGY SUMMARY
[___] : [unfilled] [LVEF ___%] : LVEF [unfilled]% [None] : normal LV function [Severe] : severe pulmonary hypertension  [Normal] : normal LA size [Mild] : mild mitral regurgitation